# Patient Record
Sex: MALE | Race: BLACK OR AFRICAN AMERICAN | Employment: FULL TIME | URBAN - METROPOLITAN AREA
[De-identification: names, ages, dates, MRNs, and addresses within clinical notes are randomized per-mention and may not be internally consistent; named-entity substitution may affect disease eponyms.]

---

## 2021-03-04 ENCOUNTER — HOSPITAL ENCOUNTER (EMERGENCY)
Age: 31
Discharge: HOME | End: 2021-03-04
Attending: EMERGENCY MEDICINE
Payer: OTHER MISCELLANEOUS

## 2021-03-04 ENCOUNTER — APPOINTMENT (OUTPATIENT)
Dept: GENERAL RADIOLOGY | Age: 31
End: 2021-03-04
Attending: EMERGENCY MEDICINE
Payer: OTHER MISCELLANEOUS

## 2021-03-04 VITALS
TEMPERATURE: 98.9 F | DIASTOLIC BLOOD PRESSURE: 95 MMHG | SYSTOLIC BLOOD PRESSURE: 135 MMHG | HEART RATE: 71 BPM | WEIGHT: 185 LBS | RESPIRATION RATE: 16 BRPM | OXYGEN SATURATION: 99 %

## 2021-03-04 DIAGNOSIS — T14.8XXA MUSCLE STRAIN: Primary | ICD-10-CM

## 2021-03-04 PROCEDURE — 72040 X-RAY EXAM NECK SPINE 2-3 VW: CPT

## 2021-03-04 PROCEDURE — 74011250637 HC RX REV CODE- 250/637: Performed by: NURSE PRACTITIONER

## 2021-03-04 PROCEDURE — 73030 X-RAY EXAM OF SHOULDER: CPT

## 2021-03-04 PROCEDURE — 99283 EMERGENCY DEPT VISIT LOW MDM: CPT

## 2021-03-04 RX ORDER — METHOCARBAMOL 750 MG/1
750 TABLET, FILM COATED ORAL
Qty: 30 TAB | Refills: 0 | Status: SHIPPED | OUTPATIENT
Start: 2021-03-04 | End: 2021-03-14

## 2021-03-04 RX ORDER — CEPHALEXIN 500 MG/1
500 CAPSULE ORAL 4 TIMES DAILY
COMMUNITY
End: 2021-06-13

## 2021-03-04 RX ORDER — IBUPROFEN 600 MG/1
600 TABLET ORAL
Qty: 30 TAB | Refills: 0 | Status: SHIPPED | OUTPATIENT
Start: 2021-03-04 | End: 2021-07-19

## 2021-03-04 RX ORDER — METHOCARBAMOL 750 MG/1
750 TABLET, FILM COATED ORAL
Status: COMPLETED | OUTPATIENT
Start: 2021-03-04 | End: 2021-03-04

## 2021-03-04 RX ADMIN — METHOCARBAMOL TABLETS 750 MG: 750 TABLET, COATED ORAL at 21:21

## 2021-03-04 NOTE — LETTER
[facility] EMERGENCY DEPT 
[id number] [facility] 
[facility] [address] 
[phone] Work/School Note Date: 3/4/2021 To Whom It May concern: 
 
Amaury Ryan was seen and treated today in the emergency room by the following provider(s): 
Attending Provider: Rahul Abarca MD 
Nurse Practitioner: Ryland Fleischer, NP. Amaury Ryan may return to work on 3/6/21. Sincerely, Rita Valentin RN

## 2021-03-05 NOTE — ED NOTES
I have reviewed discharge instructions with the patient. The patient verbalized understanding. Patient left ED via Discharge Method: ambulatory to Home with spouse    Opportunity for questions and clarification provided. Patient given 2 scripts. To continue your aftercare when you leave the hospital, you may receive an automated call from our care team to check in on how you are doing. This is a free service and part of our promise to provide the best care and service to meet your aftercare needs.  If you have questions, or wish to unsubscribe from this service please call [phone]. Thank you for Choosing our [facility] Emergency Department.
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
## 2021-06-13 ENCOUNTER — HOSPITAL ENCOUNTER (EMERGENCY)
Age: 31
Discharge: HOME | End: 2021-06-13
Attending: EMERGENCY MEDICINE
Payer: COMMERCIAL

## 2021-06-13 VITALS
RESPIRATION RATE: 16 BRPM | DIASTOLIC BLOOD PRESSURE: 75 MMHG | HEART RATE: 76 BPM | SYSTOLIC BLOOD PRESSURE: 115 MMHG | OXYGEN SATURATION: 98 % | TEMPERATURE: 98.4 F

## 2021-06-13 DIAGNOSIS — H93.13 TINNITUS OF BOTH EARS: Primary | ICD-10-CM

## 2021-06-13 PROCEDURE — 99282 EMERGENCY DEPT VISIT SF MDM: CPT

## 2021-06-13 RX ORDER — METHYLPREDNISOLONE 4 MG/1
TABLET ORAL
COMMUNITY
Start: 2021-06-04 | End: 2021-07-19

## 2021-06-13 RX ORDER — MECLIZINE HYDROCHLORIDE 25 MG/1
TABLET ORAL
COMMUNITY
Start: 2021-05-03 | End: 2021-06-22 | Stop reason: ALTCHOICE

## 2021-06-13 RX ORDER — AMOXICILLIN 875 MG/1
TABLET, FILM COATED ORAL
COMMUNITY
Start: 2021-06-04 | End: 2021-06-13

## 2021-06-13 RX ORDER — LORATADINE AND PSEUDOEPHEDRINE SULFATE 10; 240 MG/1; MG/1
1 TABLET, EXTENDED RELEASE ORAL DAILY
Qty: 15 TABLET | Refills: 0 | Status: SHIPPED | OUTPATIENT
Start: 2021-06-13 | End: 2021-07-19

## 2021-06-13 NOTE — ED PROVIDER NOTES
71-year-old male who presents to the emergency department today with complaint of ringing in both ears. Patient reports symptoms began about a month ago when he was seen in urgent care. He was prescribed amoxicillin, Medrol Dosepak, Zyrtec, and Flonase nasal spray. Patient reports that initially ringing was only in his left ear but is now in both the ears. Patient reports that he does not have ringing in his ears every day. Patient denies any headache, fever, chills, nausea, or vomiting. Patient reports that occasionally he feels like he is dizzy. He denies any loss of consciousness or hearing loss. The history is provided by the patient. Ear Pain   This is a new problem. The current episode started more than 1 week ago. The problem has not changed since onset. There has been no fever. Pertinent negatives include no ear discharge, no headaches, no hearing loss, no rhinorrhea, no sore throat, no abdominal pain, no diarrhea, no vomiting, no neck pain, no cough and no rash. The risk factors include recent URI. His past medical history does not include chronic ear infection, hearing loss or tympanostomy tube. History reviewed. No pertinent past medical history. History reviewed. No pertinent surgical history. History reviewed. No pertinent family history.     Social History     Socioeconomic History    Marital status:      Spouse name: Not on file    Number of children: Not on file    Years of education: Not on file    Highest education level: Not on file   Occupational History    Not on file   Tobacco Use    Smoking status: Never Smoker    Smokeless tobacco: Never Used   Substance and Sexual Activity    Alcohol use: Not on file    Drug use: Not on file    Sexual activity: Not on file   Other Topics Concern    Not on file   Social History Narrative    Not on file     Social Determinants of Health     Financial Resource Strain:     Difficulty of Paying Living Expenses:    Food Insecurity:     Worried About Running Out of Food in the Last Year:     [address] in the Last Year:    Transportation Needs:     Lack of Transportation (Medical):  Lack of Transportation (Non-Medical):    Physical Activity:     Days of Exercise per Week:     Minutes of Exercise per Session:    Stress:     Feeling of Stress :    Social Connections:     Frequency of Communication with Friends and Family:     Frequency of Social Gatherings with Friends and Family:     Attends Catholic Services:     Active Member of Clubs or Organizations:     Attends Club or Organization Meetings:     Marital Status:    Intimate Partner Violence:     Fear of Current or Ex-Partner:     Emotionally Abused:     Physically Abused:     Sexually Abused: ALLERGIES: Patient has no known allergies. Review of Systems   HENT: Positive for ear pain. Negative for congestion, ear discharge, hearing loss, rhinorrhea, sinus pressure and sore throat. Respiratory: Negative for cough. Gastrointestinal: Negative for abdominal pain, diarrhea and vomiting. Musculoskeletal: Negative for neck pain. Skin: Negative for rash. Neurological: Positive for dizziness. Negative for syncope and headaches. All other systems reviewed and are negative. Vitals:    06/13/21 0908   BP: 115/75   Pulse: 76   Resp: 16   Temp: 98.4 °F (36.9 °C)   SpO2: 98%            Physical Exam  Vitals and nursing note reviewed. Constitutional:       General: He is not in acute distress. Appearance: Normal appearance. He is normal weight. He is not ill-appearing, toxic-appearing or diaphoretic. HENT:      Head: Normocephalic and atraumatic. Right Ear: Hearing, ear canal and external ear normal. No drainage or tenderness. A middle ear effusion is present. There is no impacted cerumen. No foreign body. Tympanic membrane is bulging. Tympanic membrane is not injected or erythematous.       Left Ear: Hearing, ear canal and external ear normal. No drainage or tenderness. A middle ear effusion is present. There is no impacted cerumen. No foreign body. Tympanic membrane is bulging. Tympanic membrane is not injected or erythematous. Ears:      Comments: Bilateral tympanic membranes appear with bulging and middle ear effusions. Does not appear as infective. No erythema noted. Nose: Nose normal. No congestion or rhinorrhea. Mouth/Throat:      Mouth: Mucous membranes are moist.      Pharynx: Oropharynx is clear. Eyes:      Extraocular Movements: Extraocular movements intact. Conjunctiva/sclera: Conjunctivae normal.   Cardiovascular:      Rate and Rhythm: Normal rate. Pulses: Normal pulses. Pulmonary:      Effort: Pulmonary effort is normal. No respiratory distress. Abdominal:      General: Abdomen is flat. There is no distension. Musculoskeletal:         General: Normal range of motion. Cervical back: Normal range of motion and neck supple. No rigidity. Right lower leg: No edema. Left lower leg: No edema. Lymphadenopathy:      Cervical: No cervical adenopathy. Skin:     General: Skin is warm and dry. Capillary Refill: Capillary refill takes less than 2 seconds. Neurological:      General: No focal deficit present. Mental Status: He is alert and oriented to person, place, and time. GCS: GCS eye subscore is 4. GCS verbal subscore is 5. GCS motor subscore is 6. Cranial Nerves: Cranial nerves are intact. Sensory: Sensation is intact. Motor: Motor function is intact. Coordination: Coordination is intact. Gait: Gait is intact. Comments: Patient is alert and oriented. Answers all questions appropriately. Patient is ambulatory with normal, steady gait. Psychiatric:         Mood and Affect: Mood normal.         Behavior: Behavior normal.         Thought Content:  Thought content normal.         Judgment: Judgment normal.          MDM  Number of Diagnoses or Management Options  Tinnitus of both ears  Diagnosis management comments: 51-year-old male presents to the emergency department today with complaint of ringing in both of his ears. Patient states initially began on the left side but now is in both ears. Patient appears with middle ear effusion bilaterally. Does not appear as infective. Prescription for Claritin-D provided. Patient recently treated at urgent care for upper respiratory infection. Symptoms likely residual effect after URI. I have discussed this with the patient. I have also provided him with information for ENT should his symptoms persist. I have discussed the results of all labs, procedures, radiographs, and/or treatments with the patient and available family members. Danay Pau is agreed upon by the patient and the patient is ready for discharge.  Questions about treatment in the ED and differential diagnosis of presenting condition were answered. Ozzie Dave was given verbal discharge instructions including, but not limited to, importance of returning to the emergency department for any concern of worsening or continued symptoms.  Instructions were given to follow up with a primary care provider or specialist within 1-2 days. Debera Sinning effects of medications, if prescribed, were discussed and patient was advised to refrain from significant physical activity until followed up by primary care physician and to not drive or operate heavy machinery after taking any sedating substances.      Voice dictation software was used during the making of this note.  This software is not perfect and grammatical and other typographical errors may be present.  This note has been proofread, but may still contain errors.      Risk of Complications, Morbidity, and/or Mortality  Presenting problems: low  Diagnostic procedures: low  Management options: low    Patient Progress  Patient progress: improved         Procedures

## 2021-06-13 NOTE — DISCHARGE INSTRUCTIONS
Take medication as prescribed. Stop taking cetrizine. You may continue using nasal spray. Call ENT to schedule follow-up for further evaluation. Return to the ER for any new or worsening symptoms.

## 2021-06-13 NOTE — ED NOTES
I have reviewed discharge instructions with the patient. The patient verbalized understanding. Patient left ED via Discharge Method: ambulatory to Home with self    Opportunity for questions and clarification provided. Patient given 1 scripts. To continue your aftercare when you leave the hospital, you may receive an automated call from our care team to check in on how you are doing. This is a free service and part of our promise to provide the best care and service to meet your aftercare needs.  If you have questions, or wish to unsubscribe from this service please call [phone]. Thank you for Choosing our [facility] Emergency Department.